# Patient Record
Sex: FEMALE | URBAN - METROPOLITAN AREA
[De-identification: names, ages, dates, MRNs, and addresses within clinical notes are randomized per-mention and may not be internally consistent; named-entity substitution may affect disease eponyms.]

---

## 2020-07-24 PROBLEM — Z00.00 ENCOUNTER FOR PREVENTIVE HEALTH EXAMINATION: Status: ACTIVE | Noted: 2020-07-24

## 2022-12-15 ENCOUNTER — EMERGENCY (EMERGENCY)
Facility: HOSPITAL | Age: 32
LOS: 1 days | Discharge: HOME | End: 2022-12-15
Attending: STUDENT IN AN ORGANIZED HEALTH CARE EDUCATION/TRAINING PROGRAM
Payer: COMMERCIAL

## 2022-12-15 VITALS
TEMPERATURE: 98 F | SYSTOLIC BLOOD PRESSURE: 113 MMHG | OXYGEN SATURATION: 100 % | HEART RATE: 75 BPM | HEIGHT: 63 IN | DIASTOLIC BLOOD PRESSURE: 75 MMHG | RESPIRATION RATE: 16 BRPM | WEIGHT: 119.93 LBS

## 2022-12-15 VITALS
SYSTOLIC BLOOD PRESSURE: 106 MMHG | HEART RATE: 69 BPM | OXYGEN SATURATION: 100 % | RESPIRATION RATE: 22 BRPM | DIASTOLIC BLOOD PRESSURE: 65 MMHG

## 2022-12-15 LAB
ALBUMIN SERPL ELPH-MCNC: 4 G/DL — SIGNIFICANT CHANGE UP (ref 3.3–5)
ALP SERPL-CCNC: 51 U/L — SIGNIFICANT CHANGE UP (ref 40–120)
ALT FLD-CCNC: 12 U/L — SIGNIFICANT CHANGE UP (ref 10–45)
ANION GAP SERPL CALC-SCNC: 11 MMOL/L — SIGNIFICANT CHANGE UP (ref 5–17)
AST SERPL-CCNC: 21 U/L — SIGNIFICANT CHANGE UP (ref 10–40)
BASOPHILS # BLD AUTO: 0.05 K/UL — SIGNIFICANT CHANGE UP (ref 0–0.2)
BASOPHILS NFR BLD AUTO: 0.5 % — SIGNIFICANT CHANGE UP (ref 0–2)
BILIRUB SERPL-MCNC: 0.2 MG/DL — SIGNIFICANT CHANGE UP (ref 0.2–1.2)
BUN SERPL-MCNC: 15 MG/DL — SIGNIFICANT CHANGE UP (ref 7–23)
CALCIUM SERPL-MCNC: 8.4 MG/DL — SIGNIFICANT CHANGE UP (ref 8.4–10.5)
CHLORIDE SERPL-SCNC: 104 MMOL/L — SIGNIFICANT CHANGE UP (ref 96–108)
CO2 SERPL-SCNC: 23 MMOL/L — SIGNIFICANT CHANGE UP (ref 22–31)
CREAT SERPL-MCNC: 0.63 MG/DL — SIGNIFICANT CHANGE UP (ref 0.5–1.3)
EGFR: 121 ML/MIN/1.73M2 — SIGNIFICANT CHANGE UP
EOSINOPHIL # BLD AUTO: 0.06 K/UL — SIGNIFICANT CHANGE UP (ref 0–0.5)
EOSINOPHIL NFR BLD AUTO: 0.6 % — SIGNIFICANT CHANGE UP (ref 0–6)
FLUAV AG NPH QL: SIGNIFICANT CHANGE UP
FLUBV AG NPH QL: SIGNIFICANT CHANGE UP
GLUCOSE SERPL-MCNC: 87 MG/DL — SIGNIFICANT CHANGE UP (ref 70–99)
HCG SERPL-ACNC: <2 MIU/ML — SIGNIFICANT CHANGE UP
HCT VFR BLD CALC: 37.6 % — SIGNIFICANT CHANGE UP (ref 34.5–45)
HGB BLD-MCNC: 12.5 G/DL — SIGNIFICANT CHANGE UP (ref 11.5–15.5)
IMM GRANULOCYTES NFR BLD AUTO: 1.1 % — HIGH (ref 0–0.9)
LYMPHOCYTES # BLD AUTO: 1.34 K/UL — SIGNIFICANT CHANGE UP (ref 1–3.3)
LYMPHOCYTES # BLD AUTO: 13 % — SIGNIFICANT CHANGE UP (ref 13–44)
MAGNESIUM SERPL-MCNC: 2 MG/DL — SIGNIFICANT CHANGE UP (ref 1.6–2.6)
MCHC RBC-ENTMCNC: 31.2 PG — SIGNIFICANT CHANGE UP (ref 27–34)
MCHC RBC-ENTMCNC: 33.2 GM/DL — SIGNIFICANT CHANGE UP (ref 32–36)
MCV RBC AUTO: 93.8 FL — SIGNIFICANT CHANGE UP (ref 80–100)
MONOCYTES # BLD AUTO: 0.68 K/UL — SIGNIFICANT CHANGE UP (ref 0–0.9)
MONOCYTES NFR BLD AUTO: 6.6 % — SIGNIFICANT CHANGE UP (ref 2–14)
NEUTROPHILS # BLD AUTO: 8.03 K/UL — HIGH (ref 1.8–7.4)
NEUTROPHILS NFR BLD AUTO: 78.2 % — HIGH (ref 43–77)
NRBC # BLD: 0 /100 WBCS — SIGNIFICANT CHANGE UP (ref 0–0)
PHOSPHATE SERPL-MCNC: 3.2 MG/DL — SIGNIFICANT CHANGE UP (ref 2.5–4.5)
PLATELET # BLD AUTO: 224 K/UL — SIGNIFICANT CHANGE UP (ref 150–400)
POTASSIUM SERPL-MCNC: 4.6 MMOL/L — SIGNIFICANT CHANGE UP (ref 3.5–5.3)
POTASSIUM SERPL-SCNC: 4.6 MMOL/L — SIGNIFICANT CHANGE UP (ref 3.5–5.3)
PROT SERPL-MCNC: 6.3 G/DL — SIGNIFICANT CHANGE UP (ref 6–8.3)
RBC # BLD: 4.01 M/UL — SIGNIFICANT CHANGE UP (ref 3.8–5.2)
RBC # FLD: 11.8 % — SIGNIFICANT CHANGE UP (ref 10.3–14.5)
RSV RNA NPH QL NAA+NON-PROBE: SIGNIFICANT CHANGE UP
SARS-COV-2 RNA SPEC QL NAA+PROBE: SIGNIFICANT CHANGE UP
SODIUM SERPL-SCNC: 138 MMOL/L — SIGNIFICANT CHANGE UP (ref 135–145)
WBC # BLD: 10.27 K/UL — SIGNIFICANT CHANGE UP (ref 3.8–10.5)
WBC # FLD AUTO: 10.27 K/UL — SIGNIFICANT CHANGE UP (ref 3.8–10.5)

## 2022-12-15 PROCEDURE — 80053 COMPREHEN METABOLIC PANEL: CPT

## 2022-12-15 PROCEDURE — 93010 ELECTROCARDIOGRAM REPORT: CPT

## 2022-12-15 PROCEDURE — 93005 ELECTROCARDIOGRAM TRACING: CPT

## 2022-12-15 PROCEDURE — 71046 X-RAY EXAM CHEST 2 VIEWS: CPT | Mod: 26

## 2022-12-15 PROCEDURE — 87637 SARSCOV2&INF A&B&RSV AMP PRB: CPT

## 2022-12-15 PROCEDURE — 99285 EMERGENCY DEPT VISIT HI MDM: CPT | Mod: 25

## 2022-12-15 PROCEDURE — 36415 COLL VENOUS BLD VENIPUNCTURE: CPT

## 2022-12-15 PROCEDURE — 84100 ASSAY OF PHOSPHORUS: CPT

## 2022-12-15 PROCEDURE — 85025 COMPLETE CBC W/AUTO DIFF WBC: CPT

## 2022-12-15 PROCEDURE — 84702 CHORIONIC GONADOTROPIN TEST: CPT

## 2022-12-15 PROCEDURE — 83735 ASSAY OF MAGNESIUM: CPT

## 2022-12-15 PROCEDURE — 99284 EMERGENCY DEPT VISIT MOD MDM: CPT

## 2022-12-15 PROCEDURE — 71046 X-RAY EXAM CHEST 2 VIEWS: CPT

## 2022-12-15 RX ORDER — SODIUM CHLORIDE 9 MG/ML
1000 INJECTION, SOLUTION INTRAVENOUS ONCE
Refills: 0 | Status: COMPLETED | OUTPATIENT
Start: 2022-12-15 | End: 2022-12-15

## 2022-12-15 RX ADMIN — SODIUM CHLORIDE 1000 MILLILITER(S): 9 INJECTION, SOLUTION INTRAVENOUS at 12:03

## 2022-12-15 NOTE — ED ADULT NURSE NOTE - NSIMPLEMENTINTERV_GEN_ALL_ED
Implemented All Universal Safety Interventions:  [place] to call system. Call bell, personal items and telephone within reach. Instruct patient to call for assistance. Room bathroom lighting operational. Non-slip footwear when patient is off stretcher. Physically safe environment: no spills, clutter or unnecessary equipment. Stretcher in lowest position, wheels locked, appropriate side rails in place.

## 2022-12-15 NOTE — ED PROVIDER NOTE - ATTENDING CONTRIBUTION TO CARE
I, Sabino Da Silva, performed a history and physical exam of the patient and discussed their management with the resident and /or advanced care provider. I reviewed the resident and /or ACP's note and agree with the documented findings and plan of care. I was present and available for all procedures.    32-year-old female with a past medical history of low B12 presents with 2 near syncopal episodes in the setting of a weeks worth of cough after being diagnosed with bronchitis and completed a 5-day course of steroids.  Patient states that she woke up today in her normal state of health aside from the chest tightness that she been experiencing for a week along with a cough was doing well.  Patient had a normal breakfast and was on the train and started to feel warm and clammy was able to get off the train and sit down when symptoms abated.  Patient decided go to urgent care for further evaluation and had another repeat episode where she nearly syncopized and was found to be bradycardic and hypotensive.  On presentation to the ED patient nontoxic-appearing, initial vitals are unremarkable with an EKG showing normal sinus rhythm at 63 bpm without any signs of acute ischemia or ectopy.  Presentation most concerning for vasovagal syncope in the setting of a viral illness, low clinical suspicion for ACS, PE, sepsis.  We will repeat a chest x-ray, obtain basic labs, give IV hydration and likely discharge home if work-up is unremarkable.  On exam patient without any pedal edema, calf tenderness or swelling or any history of any blood clots or bleeding disorders and is not on any estrogen related birth control.

## 2022-12-15 NOTE — ED ADULT NURSE NOTE - OBJECTIVE STATEMENT
32-year-old female no past medical history presents ED complaining of 2 presyncopal episodes earlier today.  First episode while patient was riding the train, patient felt lightheaded and as if she was about to pass out.  Symptoms self resolved and patient was able to get herself to urgent care.  Urgent care was concerned and called EMS, while EMS was on scene patient heart rate dropped into the 30s and blood pressure dropped to 70/30 on the monitor.  Patient never lost consciousness either time.  Patient received 1 L IV fluids in route to the hospital.  Patient's children have been sick with the flu with the past week, patient has been sick with productive cough generalized malaise  IV from EMS and bloods sent as ordered Alec

## 2022-12-15 NOTE — ED ADULT NURSE NOTE - SUICIDE SCREENING QUESTION 2
Khadra Luque is a 77 year old female presenting for   Chief Complaint   Patient presents with   • Routine Foot Care     1942  [phone] Daphne Jones MD   Date of last visit with Daphne Jones MD 7/11/19    Denies known Latex allergy or symptoms of Latex sensitivity.  Medications reviewed and updated.  Allergies reviewed and updated.      Patient refused vitals      No

## 2022-12-15 NOTE — ED PROVIDER NOTE - PHYSICAL EXAMINATION
GEN: Patient awake alert NAD.   HEENT: normocephalic, atraumatic, EOMI, no scleral icterus, moist MM  CARDIAC: RRR, S1, S2, no murmur.   PULM: CTA B/L no wheeze, rhonchi, rales.   ABD: soft NT, ND, no rebound no guarding, no CVA tenderness.   MSK: Moving all extremities, no edema.      NEURO: A&Ox3, no focal neurological deficits,   SKIN: warm, dry, no rash.

## 2022-12-15 NOTE — ED PROVIDER NOTE - PATIENT PORTAL LINK FT
You can access the FollowMyHealth Patient Portal offered by [facility] by registering at the following website: [email or web address]. By joining [employer]’s FollowMyHealth portal, you will also be able to view your health information using other applications (apps) compatible with our system.

## 2022-12-15 NOTE — ED PROVIDER NOTE - NSFOLLOWUPINSTRUCTIONS_ED_ALL_ED_FT
Please follow-up with your primary care doctor within the next week.    Please keep yourself well-hydrated.      Near Syncope    WHAT YOU NEED TO KNOW:    Near syncope, also called presyncope, is the feeling that you may faint (lose consciousness), but you do not. You can control some health conditions that cause near syncope. Your healthcare providers can help you create a plan to manage near syncope and prevent episodes.    DISCHARGE INSTRUCTIONS:    Return to the emergency department if:   •You have sudden chest pain.       •You have trouble breathing or shortness of breath.       •You have vision changes, are sweating, and have nausea while you are sitting or lying down.       •You feel dizzy or flushed and your heart is fluttering.      •You lose consciousness.      •You cannot use your arm, hand, foot, or leg, or it feels weak.      •You have trouble speaking or understanding others when they speak.      Contact your healthcare provider if:   •You have new or worsening symptoms.      •Your heart beats faster or slower than usual.       •You have questions or concerns about your condition or care.      Medicines:   •Medicines may be needed to help your heart pump strongly and regularly. Your healthcare provider may also make changes to any medicines that are causing syncope.       •Take your medicine as directed. Contact your healthcare provider if you think your medicine is not helping or if you have side effects. Tell your provider if you are allergic to any medicine. Keep a list of the medicines, vitamins, and herbs you take. Include the amounts, and when and why you take them. Bring the list or the pill bottles to follow-up visits. Carry your medicine list with you in case of an emergency.      Follow up with your healthcare provider as directed: You may need more tests to help find the cause of your near syncope episodes. The tests will help healthcare providers plan the best treatment for you. Write down your questions so you remember to ask them during your visits.     Manage near syncope:   •Sit or lie down when needed. This includes when you feel dizzy, your throat is getting tight, and your vision changes. Raise your legs above the level of your heart.      •Take slow, deep breaths if you start to breathe faster with anxiety or fear. This can help decrease dizziness and the feeling that you might faint.       •Keep a record of your near syncope episodes. Include your symptoms and your activity before and after the episode. The record can help your healthcare provider find the cause of your near syncope and help you manage episodes.      Prevent a near syncope episode:   •Move slowly and let yourself get used to one position before you move to another position. This is very important when you change from a lying or sitting position to a standing position. Take some deep breaths before you stand up from a lying position. Stand up slowly. Sudden movements may cause a fainting spell. Sit on the side of the bed or couch for a few minutes before you stand up. If you are on bedrest, try to be upright for about 2 hours each day, or as directed. Do not lock your legs if you are standing for a long period of time. Move your legs and bend your knees to keep blood flowing.      •Follow your healthcare provider's recommendations. Your provider may recommend that you drink more liquids to prevent dehydration. You may also need to have more salt to keep your blood pressure from dropping too low and causing syncope. Your provider will tell you how much liquid and sodium to have each day. He or she will also tell you how much physical activity is safe for you. This will depend on what is causing your near syncope.      •Watch for signs of low blood sugar. These include hunger, nervousness, sweating, and fast or fluttery heartbeats. Talk with your healthcare provider about ways to keep your blood sugar level steady.      •Check your blood pressure often. This is important if you take medicine to lower your blood pressure. Check your blood pressure when you are lying down and when you are standing. Ask how often to check during the day. Keep a record of your blood pressure numbers. Your healthcare provider may use the record to help plan your treatment.  How to take a Blood Pressure           •Do not strain if you are constipated. You may faint if you strain to have a bowel movement. Walking is the best way to get your bowels moving. Eat foods high in fiber to make it easier to have a bowel movement. Good examples are high-fiber cereals, beans, vegetables, and whole-grain breads. Prune juice may help make bowel movements softer.      •Do not exercise outside on a hot day. The combination of physical activity and heat can lead to dehydration. This can cause syncope.

## 2022-12-15 NOTE — ED PROVIDER NOTE - CLINICAL SUMMARY MEDICAL DECISION MAKING FREE TEXT BOX
Ernie Emery,  PGY-2: 32-year-old female with no past medical history presenting with multiple presyncopal episodes earlier today where patient's heart rate and blood pressure dropped while at rest.  Patient was feeling unwell for last week with positive sick contacts with her children.  Patient is currently asymptomatic and well-appearing.  Differential includes but not limited to vasovagal syncope, viral syndrome, PNA, electrolyte imbalance, dysrhythmia.  Plan for labs, EKG, chest x-ray, IV fluids, orthostatics.  Reassess

## 2022-12-15 NOTE — ED PROVIDER NOTE - PROGRESS NOTE DETAILS
Ernie Emery, DO PGY-2: Labs, x-ray are nonactionable.  Patient well-appearing.  Will DC with PMD follow-up.

## 2022-12-15 NOTE — ED ADULT NURSE NOTE - PAIN RATING/NUMBER SCALE (0-10): REST
Routing to provider.    Dr. Garzon, please see message from patient. Next appt 4/21/22. Are you able to get her in any sooner?   0

## 2022-12-15 NOTE — ED PROVIDER NOTE - NSDCPRINTRESULTS_ED_ALL_ED
Pulmonology Patient requests all Lab, Cardiology, and Radiology Results on their Discharge Instructions

## 2022-12-15 NOTE — ED PROVIDER NOTE - OBJECTIVE STATEMENT
32-year-old female no past medical history presents ED complaining of 2 presyncopal episodes earlier today.  First episode while patient was riding the train, patient felt lightheaded and as if she was about to pass out.  Symptoms self resolved and patient was able to get herself to urgent care.  Urgent care was concerned and called EMS, while EMS was on scene patient heart rate dropped into the 30s and blood pressure dropped to 70/30 on the monitor.  Patient never lost consciousness either time.  Patient received 1 L IV fluids in route to the hospital.  Patient's children have been sick with the flu with the past week, patient has been sick with productive cough generalized malaise.  Patient may have had a fever a week ago but nothing since.  Denies nausea, vomiting, abdominal pain, shortness of breath, chest pain, dysuria, diarrhea.  Patient is currently asymptomatic and feels well.

## 2024-08-09 NOTE — ED ADULT TRIAGE NOTE - ACCOMPANIED BY
Sedation Pre-Procedure Note    Patient Name: Linh Parrish   YOB: 1968  Room/Bed: 8503/8503-A  Medical Record Number: [id number]  Date: 8/9/2024   Time: 1:40 PM       Indication:  abdominal mass    Consent: I have discussed with the patient and/or the patient representative the indication, alternatives, and the possible risks and/or complications of the planned procedure and the anesthesia methods. The patient and/or patient representative appear to understand and agree to proceed.    Vital Signs:   Vitals:    08/09/24 1338   BP: (!) 178/117   Pulse: 57   Resp: 16   Temp:    SpO2: 100%       Past Medical History:   has a past medical history of Attention deficit disorder (ADD) without hyperactivity, Depression, HETAL (generalized anxiety disorder), and Melanoma in situ of back (HCC).    Past Surgical History:   has a past surgical history that includes Umbilical hernia repair and malignant skin lesion excision (06/01/2021).    Medications:   Scheduled Meds:    amLODIPine  2.5 mg Oral Daily    dexAMETHasone  4 mg IntraVENous Q6H    famotidine  20 mg Oral BID    atenolol  50 mg Oral Daily    sodium chloride flush  5-40 mL IntraVENous 2 times per day     Continuous Infusions:    sodium chloride       PRN Meds: diphenhydrAMINE, midazolam, fentanNYL, lidocaine, lidocaine-EPINEPHrine, Surgiflo with Evithrom Hemostatic Matrix, sodium chloride flush, sodium chloride, potassium chloride **OR** potassium alternative oral replacement **OR** potassium chloride, magnesium sulfate, ondansetron **OR** ondansetron, polyethylene glycol, acetaminophen **OR** acetaminophen  Home Meds:   Prior to Admission medications    Medication Sig Start Date End Date Taking? Authorizing Provider   amphetamine-dextroamphetamine (ADDERALL, 30MG,) 30 MG tablet Take 1 tablet by mouth 2 times daily for 30 days. Max Daily Amount: 60 mg 4/26/24 8/6/24  Missy Fuchs, DO   Multiple Vitamin (MVI, CELEBRATE, CHEWABLE TABLET) Take 1 tablet by  EMT/paramedic

## 2024-10-18 ENCOUNTER — NON-APPOINTMENT (OUTPATIENT)
Age: 34
End: 2024-10-18